# Patient Record
Sex: FEMALE | Race: WHITE | NOT HISPANIC OR LATINO | Employment: UNEMPLOYED | URBAN - METROPOLITAN AREA
[De-identification: names, ages, dates, MRNs, and addresses within clinical notes are randomized per-mention and may not be internally consistent; named-entity substitution may affect disease eponyms.]

---

## 2023-05-16 ENCOUNTER — OFFICE VISIT (OUTPATIENT)
Dept: URGENT CARE | Facility: CLINIC | Age: 38
End: 2023-05-16
Payer: COMMERCIAL

## 2023-05-16 VITALS
BODY MASS INDEX: 39.68 KG/M2 | DIASTOLIC BLOOD PRESSURE: 84 MMHG | HEIGHT: 72 IN | SYSTOLIC BLOOD PRESSURE: 138 MMHG | OXYGEN SATURATION: 94 % | TEMPERATURE: 97.8 F | HEART RATE: 108 BPM | WEIGHT: 293 LBS | RESPIRATION RATE: 16 BRPM

## 2023-05-16 DIAGNOSIS — J01.90 ACUTE NON-RECURRENT SINUSITIS, UNSPECIFIED LOCATION: ICD-10-CM

## 2023-05-16 DIAGNOSIS — R68.89 SUSPECTED SOFT TISSUE INFECTION: ICD-10-CM

## 2023-05-16 PROCEDURE — 3075F SYST BP GE 130 - 139MM HG: CPT | Performed by: PHYSICIAN ASSISTANT

## 2023-05-16 PROCEDURE — 3079F DIAST BP 80-89 MM HG: CPT | Performed by: PHYSICIAN ASSISTANT

## 2023-05-16 PROCEDURE — 99204 OFFICE O/P NEW MOD 45 MIN: CPT | Performed by: PHYSICIAN ASSISTANT

## 2023-05-16 RX ORDER — CEFDINIR 300 MG/1
300 CAPSULE ORAL 2 TIMES DAILY
Qty: 14 CAPSULE | Refills: 0 | Status: SHIPPED | OUTPATIENT
Start: 2023-05-16 | End: 2023-05-23

## 2023-05-16 ASSESSMENT — ENCOUNTER SYMPTOMS
COUGH: 0
SORE THROAT: 0
SINUS PRESSURE: 1

## 2023-05-17 NOTE — PROGRESS NOTES
Subjective:   Karolina Prasad is a 37 y.o. female who presents for Nasal Congestion (X 6 days with nose and face pain, fever.  Hx of asthma. )        Sinusitis  This is a new problem. The current episode started in the past 7 days. The problem has been gradually worsening since onset. Maximum temperature: Tactile fever. The pain is moderate. Associated symptoms include congestion (mild) and sinus pressure. Pertinent negatives include no coughing, ear pain or sore throat. (Swelling and redness over the nose.)     Review of Systems   HENT:  Positive for congestion (mild) and sinus pressure. Negative for ear pain and sore throat.    Respiratory:  Negative for cough.        PMH:  has no past medical history of Cancer (HCC) or Diabetes (HCC).  MEDS:   Current Outpatient Medications:     cefdinir (OMNICEF) 300 MG Cap, Take 1 Capsule by mouth 2 times a day for 7 days., Disp: 14 Capsule, Rfl: 0    LEVONORGESTREL PO, Take  by mouth., Disp: , Rfl:     desoximetasone (TOPICORT) 0.25 % Cream, Apply 1 Film to affected area(s) 2 times a day. (Patient not taking: Reported on 5/16/2023), Disp: 1 Tube, Rfl: 0  ALLERGIES:   Allergies   Allergen Reactions    Penicillins Rash and Unspecified     Progressive numbness     SURGHX: No past surgical history on file.  SOCHX:  reports that she has never smoked. She has never used smokeless tobacco. She reports current alcohol use. She reports current drug use. Drugs: Inhaled and Marijuana.  FH: Family history was reviewed, no pertinent findings to report   Objective:   /84   Pulse (!) 108   Temp 36.6 °C (97.8 °F) (Temporal)   Resp 16   Ht 1.829 m (6')   Wt (!) 145 kg (320 lb)   SpO2 94%   BMI 43.40 kg/m²   Physical Exam  Vitals reviewed.   Constitutional:       General: She is not in acute distress.     Appearance: Normal appearance. She is well-developed. She is not toxic-appearing.   HENT:      Head: Normocephalic and atraumatic.      Right Ear: External ear normal.       Left Ear: External ear normal.      Nose: Mucosal edema and congestion present.      Right Sinus: Maxillary sinus tenderness and frontal sinus tenderness present.      Left Sinus: Maxillary sinus tenderness and frontal sinus tenderness present.      Comments: Patient has mild edema over the soft tissues of the nose.  Minimal erythema.  Mildly warm to touch.  Tender to palpation.  Cardiovascular:      Rate and Rhythm: Regular rhythm. Tachycardia present.   Pulmonary:      Effort: Pulmonary effort is normal. No respiratory distress.      Breath sounds: No stridor.   Skin:     General: Skin is dry.   Neurological:      Comments: Alert and oriented.    Psychiatric:         Speech: Speech normal.         Behavior: Behavior normal.           Assessment/Plan:   1. Suspected soft tissue infection  - cefdinir (OMNICEF) 300 MG Cap; Take 1 Capsule by mouth 2 times a day for 7 days.  Dispense: 14 Capsule; Refill: 0    2. Acute non-recurrent sinusitis, unspecified location  - cefdinir (OMNICEF) 300 MG Cap; Take 1 Capsule by mouth 2 times a day for 7 days.  Dispense: 14 Capsule; Refill: 0    History and symptoms consistent with sinusitis.  I am also concerned that patient may have cellulitis over her nose as well.  Patient has history of rash with penicillins but does not experience anaphylaxis with these.  We will start her on Omnicef.    Patient mildly tachycardic but afebrile and nontoxic-appearing.  If she develops a temperature of over 100.4F, rapidly progressing facial erythema or edema, or other severe and concerning symptoms-to ED for reevaluation.  Additionally if symptoms fail to improve within 48 hours I would also like her to be medically reevaluated.

## 2023-05-22 ENCOUNTER — HOSPITAL ENCOUNTER (EMERGENCY)
Facility: MEDICAL CENTER | Age: 38
End: 2023-05-22
Attending: EMERGENCY MEDICINE
Payer: COMMERCIAL

## 2023-05-22 VITALS
DIASTOLIC BLOOD PRESSURE: 80 MMHG | OXYGEN SATURATION: 93 % | TEMPERATURE: 97.4 F | BODY MASS INDEX: 39.68 KG/M2 | SYSTOLIC BLOOD PRESSURE: 133 MMHG | HEART RATE: 94 BPM | RESPIRATION RATE: 18 BRPM | WEIGHT: 293 LBS | HEIGHT: 72 IN

## 2023-05-22 DIAGNOSIS — J01.00 ACUTE MAXILLARY SINUSITIS, RECURRENCE NOT SPECIFIED: ICD-10-CM

## 2023-05-22 PROCEDURE — A9270 NON-COVERED ITEM OR SERVICE: HCPCS | Performed by: EMERGENCY MEDICINE

## 2023-05-22 PROCEDURE — 99283 EMERGENCY DEPT VISIT LOW MDM: CPT

## 2023-05-22 PROCEDURE — 700102 HCHG RX REV CODE 250 W/ 637 OVERRIDE(OP): Performed by: EMERGENCY MEDICINE

## 2023-05-22 RX ORDER — DEXAMETHASONE 4 MG/1
12 TABLET ORAL DAILY
Status: DISCONTINUED | OUTPATIENT
Start: 2023-05-22 | End: 2023-05-22 | Stop reason: HOSPADM

## 2023-05-22 RX ORDER — FLUTICASONE PROPIONATE 50 MCG
1 SPRAY, SUSPENSION (ML) NASAL DAILY
Qty: 16 G | Refills: 0 | Status: SHIPPED | OUTPATIENT
Start: 2023-05-22

## 2023-05-22 RX ADMIN — DEXAMETHASONE 12 MG: 4 TABLET ORAL at 12:55

## 2023-05-22 ASSESSMENT — LIFESTYLE VARIABLES: DO YOU DRINK ALCOHOL: NO

## 2023-05-22 NOTE — DISCHARGE INSTRUCTIONS
Utilize nasal saline spray as discussed.  Take Tylenol as needed for pain control.  Return if you are acutely worse with fever, increased swelling, or headaches

## 2023-05-22 NOTE — ED PROVIDER NOTES
ED Provider Note    CHIEF COMPLAINT  Chief Complaint   Patient presents with    Congestion     Pt reports being seen at  on 5/16 with zpack and abx for sinus infection, pt reports taking medication as prescribed.  Pt states swelling and pain is not getting better, fevers with chills continue at home.        EXTERNAL RECORDS REVIEWED  Other reviewed the patient's urgent care note from 16 May when she presented there with nasal congestion and sinus pressure.  The patient was placed on Omnicef for 1 week    HPI/ROS    Karolina Prasad is a 37 y.o. female who presents with congestion and facial pressure and swelling.  The patient's been sick over the last couple of weeks with suspected sinus infection.  She was placed on Omnicef as mentioned above.  She states today she started having some low-grade fevers with some continued facial pressure and swelling.  She also has some anterior cervical adenopathy.  She has not had any vomiting or diarrhea.  She does not smoke.  She is unaware of any sick contacts.    PAST MEDICAL HISTORY       SURGICAL HISTORY  patient denies any surgical history    FAMILY HISTORY  Family History   Problem Relation Age of Onset    Cancer Father     Heart Disease Father     Stroke Father        SOCIAL HISTORY  Social History     Tobacco Use    Smoking status: Never    Smokeless tobacco: Never   Vaping Use    Vaping Use: Never used   Substance and Sexual Activity    Alcohol use: Yes     Comment: occ    Drug use: Yes     Types: Inhaled, Marijuana     Comment: last time a few weeks ago    Sexual activity: Not on file       CURRENT MEDICATIONS  Home Medications       Reviewed by Charissa Alicea R.N. (Registered Nurse) on 05/22/23 at 1155  Med List Status: Partial     Medication Last Dose Status   cefdinir (OMNICEF) 300 MG Cap  Active   desoximetasone (TOPICORT) 0.25 % Cream  Active   LEVONORGESTREL PO  Active                    ALLERGIES  Allergies   Allergen Reactions    Penicillins Rash and  Unspecified     Progressive numbness       PHYSICAL EXAM  VITAL SIGNS: BP (!) 171/111   Pulse (!) 105   Temp 36.4 °C (97.5 °F) (Temporal)   Resp 18   Ht 1.829 m (6')   Wt (!) 144 kg (317 lb 3.9 oz)   SpO2 96%   BMI 43.03 kg/m²    In general the patient does not appear toxic    Ears tympanic membranes are retracted bilaterally but nonerythematous, nares have swollen turbinates, oropharynx nonerythematous without exudates    Neck has anterior cervical adenopathy    Pulmonary chest clear to auscultation bilaterally    Cardiovascular S1-S2 with a slightly tachycardic rate    GI abdomen soft    Skin no pallor no rashes appreciated      COURSE & MEDICAL DECISION MAKING  This is a 37-year-old female who presents the emergency department signs and symptoms consistent with sinusitis.  She did finish a course of Omnicef.  I suspect is the inflammation that is causing the discomfort.  The patient will be treated with one-time dose of Decadron emergency department followed by Flonase as well as nasal saline spray.  The patient will take anti-inflammatories as needed and she will focus on oral hydration.  If she does not start to have significant improvement in 3 to 5 days I will have the patient follow-up with ENT.  She does not appear toxic at this time.  She is slightly hypertensive and tachycardic and I suspect is from the discomfort.    FINAL DIAGNOSIS  Acute sinusitis    Disposition  The patient will be discharged in stable condition       Electronically signed by: Jakob Broussard M.D., 5/22/2023 12:41 PM

## 2023-05-22 NOTE — ED TRIAGE NOTES
Chief Complaint   Patient presents with    Congestion     Pt reports being seen at  on 5/16 with zpack and abx for sinus infection, pt reports taking medication as prescribed.  Pt states swelling and pain is not getting better, fevers with chills continue at home.      Pt to triage from lobby with above complaint.      BP (!) 171/111   Pulse (!) 105   Temp 36.4 °C (97.5 °F) (Temporal)   Resp 18   Ht 1.829 m (6')   Wt (!) 144 kg (317 lb 3.9 oz)   SpO2 96%   BMI 43.03 kg/m²